# Patient Record
Sex: MALE | Race: OTHER | URBAN - NONMETROPOLITAN AREA
[De-identification: names, ages, dates, MRNs, and addresses within clinical notes are randomized per-mention and may not be internally consistent; named-entity substitution may affect disease eponyms.]

---

## 2019-04-16 ENCOUNTER — IMPORTED ENCOUNTER (OUTPATIENT)
Dept: URBAN - NONMETROPOLITAN AREA CLINIC 1 | Facility: CLINIC | Age: 74
End: 2019-04-16

## 2019-04-16 PROBLEM — H25.13: Noted: 2019-04-16

## 2019-04-16 PROBLEM — G51.0: Noted: 2019-04-16

## 2019-04-16 PROCEDURE — 92002 INTRM OPH EXAM NEW PATIENT: CPT

## 2019-07-11 NOTE — PATIENT DISCUSSION
Start Eryhtromycin becky to OS qhs for 1 week and prn. E-Rx sent to pharmacy and explained how to apply becky and that is thick and will cause blurred vision. Recommended regular use of ATs throughout the day and q 1-2 hours if needed. PF Refresh Reinaldo sample given today.

## 2019-07-11 NOTE — PATIENT DISCUSSION
Explained Ectropion and that tissues are exposed to the air and more irritated. Explained that using ATs will help keep exposed tissues moist and comfortable.

## 2019-10-23 NOTE — PATIENT DISCUSSION
Explained Ectropion and that using ATs during the daytime and Erythromycin becky qhs will help keep exposed tissues moist and comfortable.

## 2019-12-16 ENCOUNTER — IMPORTED ENCOUNTER (OUTPATIENT)
Dept: URBAN - NONMETROPOLITAN AREA CLINIC 1 | Facility: CLINIC | Age: 74
End: 2019-12-16

## 2019-12-16 PROBLEM — G51.0: Noted: 2019-12-16

## 2019-12-16 PROBLEM — B02.39: Noted: 2019-12-16

## 2019-12-16 PROBLEM — H25.13: Noted: 2019-12-16

## 2019-12-16 PROCEDURE — 92012 INTRM OPH EXAM EST PATIENT: CPT

## 2019-12-16 NOTE — PATIENT DISCUSSION
hzoster w/o corneal involvementeducate ptfinish valtrex 1000 mg tid po x 10 daysrtc prnBELL'S PALSY OD SIDECONT TEARS PRNSTART SHRAVAN QHS ODMONITORCataract OU-Not yet surgical. -Reviewed symptoms of advancing cataract growth such as glare and halos and decreased vision.-Continue to monitor for now. Pt will notify us if any new symptoms develop. HX OF LUKEMIACURRENTLY UNDER TX FOR

## 2020-09-14 ENCOUNTER — IMPORTED ENCOUNTER (OUTPATIENT)
Dept: URBAN - NONMETROPOLITAN AREA CLINIC 1 | Facility: CLINIC | Age: 75
End: 2020-09-14

## 2020-09-14 PROBLEM — H25.13: Noted: 2020-09-14

## 2020-09-14 PROBLEM — G51.0: Noted: 2020-09-14

## 2020-09-14 PROCEDURE — 99024 POSTOP FOLLOW-UP VISIT: CPT

## 2020-09-14 NOTE — PATIENT DISCUSSION
BELL'S PALSY OD SIDEresolvingHX OF LUKEMIACURRENTLY UNDER TX FORCataract(s)-Visually significant.-Cataract(s) causing symptomatic impairment of visual function not correctable with a tolerable change in glasses or contact lenses lighting or non-operative means resulting in specific activity limitations and/or participation restrictions including but not limited to reading viewing television driving or meeting vocational or recreational needs. -Expectation is clearer vision and reduced glare disability after cataract removal.-Refer to Dr David Barrientos for cataract evaluation

## 2020-11-13 ENCOUNTER — IMPORTED ENCOUNTER (OUTPATIENT)
Dept: URBAN - NONMETROPOLITAN AREA CLINIC 1 | Facility: CLINIC | Age: 75
End: 2020-11-13

## 2020-11-13 PROBLEM — H25.813: Noted: 2020-11-13

## 2020-11-13 PROBLEM — G51.0: Noted: 2020-11-13

## 2020-11-13 PROCEDURE — 92014 COMPRE OPH EXAM EST PT 1/>: CPT

## 2020-11-13 NOTE — PATIENT DISCUSSION
Cataract(s)-Visually significant cataract OU .-Cataract(s) causing symptomatic impairment of visual function not correctable with a tolerable change in glasses or contact lenses lighting or non-operative means resulting in specific activity limitations and/or participation restrictions including but not limited to reading viewing television driving or meeting vocational or recreational needs. -Expectation is clearer vision and functional improvement in symptoms as well as reduced glare disability after cataract removal.-Order IOLMaster and OPD today. -Recommend Stand/Trad  based on today's OPD testing and lifestyle questionnaire.-All questions were answered regarding surgery including pre and post-op medications appointments activity restrictions and anesthetic usage.-The risks benefits and alternatives and special risk factors for the patient were discussed in detail including but not limited to: bleeding infection retinal detachment vitreous loss problems with the implant and possible need for additional surgery.-Although rare the possibility of complete vision loss was discussed.-The possible need for glasses post-operatively was discussed.-Order medical clearance exam based on history of bells palsy -Patient elects to proceed with cataract surgery OD . Will schedule at patient's convenience and re-evaluate OS  in the future. Discussed all lens options Recommend Stand/Trad

## 2020-11-17 PROBLEM — G51.0: Noted: 2020-11-17

## 2020-11-17 PROBLEM — H25.813: Noted: 2020-11-17

## 2020-11-18 ENCOUNTER — IMPORTED ENCOUNTER (OUTPATIENT)
Dept: URBAN - NONMETROPOLITAN AREA CLINIC 1 | Facility: CLINIC | Age: 75
End: 2020-11-18

## 2020-11-18 PROBLEM — Z01.818: Noted: 2020-11-18

## 2020-11-18 PROBLEM — H25.813: Noted: 2020-11-18

## 2020-11-18 PROBLEM — G51.0: Noted: 2020-11-18

## 2020-12-01 ENCOUNTER — IMPORTED ENCOUNTER (OUTPATIENT)
Dept: URBAN - NONMETROPOLITAN AREA CLINIC 1 | Facility: CLINIC | Age: 75
End: 2020-12-01

## 2020-12-01 PROBLEM — Z98.41: Noted: 2020-12-01

## 2020-12-01 PROBLEM — G51.0: Noted: 2020-12-01

## 2020-12-01 PROBLEM — Z01.818: Noted: 2020-12-01

## 2020-12-01 PROBLEM — H25.813: Noted: 2020-12-01

## 2020-12-01 PROCEDURE — 99024 POSTOP FOLLOW-UP VISIT: CPT

## 2020-12-01 NOTE — PATIENT DISCUSSION
s/p PCIOL Stand/Trad IOL OD 11/30/20 JS-Pt doing well s/p PCIOL. -Continue post-op gtts according to instruction sheet and sleep with eye shield over eye for 7 nights.-Avoid bending at the waist lifting anything over 5lbs and dirty or riley environments. -RTC 1wk.

## 2020-12-07 ENCOUNTER — IMPORTED ENCOUNTER (OUTPATIENT)
Dept: URBAN - NONMETROPOLITAN AREA CLINIC 1 | Facility: CLINIC | Age: 75
End: 2020-12-07

## 2020-12-07 PROBLEM — Z01.818: Noted: 2020-12-07

## 2020-12-07 PROBLEM — Z98.41: Noted: 2020-12-01

## 2020-12-07 PROBLEM — G51.0: Noted: 2020-12-07

## 2020-12-07 PROBLEM — H25.812: Noted: 2020-12-07

## 2020-12-07 PROBLEM — H25.813: Noted: 2020-12-07

## 2020-12-07 PROCEDURE — 99024 POSTOP FOLLOW-UP VISIT: CPT

## 2020-12-07 NOTE — PATIENT DISCUSSION
Cataract(s)-Visually significant cataract OS. -Cataract(s) causing symptomatic impairment of visual function not correctable with a tolerable change in glasses or contact lenses lighting or non-operative means resulting in specific activity limitations and/or participation restrictions including but not limited to reading viewing television driving or meeting vocational or recreational needs. -Expectation is clearer vision and functional improvement in symptoms as well as reduced glare disability after cataract removal.-Recommend Stand'/TRAD based on previous OPD testing and lifestyle questionnaire.-All questions were answered regarding surgery including pre and post-op medications appointments activity restrictions and anesthetic usage.-The risks benefits and alternatives and special risk factors for the patient were discussed in detail including but not limited to: bleeding infection retinal detachment vitreous loss problems with the implant and possible need for additional surgery.-Although rare the possibility of complete vision loss was discussed.-The need for glasses post-operatively was discussed.-Patient elects to proceed with cataract surgery OS . s/p PCIOL-Pt doing well at 1 week s/p PCIOL. -Continue post-op gtts according to instruction sheet.-Okay to resume usual activites and d/c eye shield.

## 2020-12-10 ENCOUNTER — IMPORTED ENCOUNTER (OUTPATIENT)
Dept: URBAN - NONMETROPOLITAN AREA CLINIC 1 | Facility: CLINIC | Age: 75
End: 2020-12-10

## 2020-12-10 PROBLEM — H25.813: Noted: 2020-12-10

## 2020-12-10 PROBLEM — Z98.42: Noted: 2020-12-10

## 2020-12-10 PROBLEM — G51.0: Noted: 2020-12-10

## 2020-12-10 PROBLEM — Z01.818: Noted: 2020-12-10

## 2020-12-10 PROCEDURE — 99024 POSTOP FOLLOW-UP VISIT: CPT

## 2020-12-10 NOTE — PATIENT DISCUSSION
s/p PCIOL Stand/Trad IOL OS 12/6/20 JS-Pt doing well s/p PCIOL. -Continue post-op gtts according to instruction sheet and sleep with eye shield over eye for 7 nights.-Avoid bending at the waist lifting anything over 5lbs and dirty or riley environments. -RTC 1wk.

## 2020-12-16 ENCOUNTER — IMPORTED ENCOUNTER (OUTPATIENT)
Dept: URBAN - NONMETROPOLITAN AREA CLINIC 1 | Facility: CLINIC | Age: 75
End: 2020-12-16

## 2020-12-16 PROCEDURE — 99024 POSTOP FOLLOW-UP VISIT: CPT

## 2021-01-04 ENCOUNTER — IMPORTED ENCOUNTER (OUTPATIENT)
Dept: URBAN - NONMETROPOLITAN AREA CLINIC 1 | Facility: CLINIC | Age: 76
End: 2021-01-04

## 2021-01-04 PROCEDURE — 99024 POSTOP FOLLOW-UP VISIT: CPT

## 2021-05-03 ENCOUNTER — IMPORTED ENCOUNTER (OUTPATIENT)
Dept: URBAN - NONMETROPOLITAN AREA CLINIC 1 | Facility: CLINIC | Age: 76
End: 2021-05-03

## 2021-05-03 PROBLEM — H26.493: Noted: 2021-05-03

## 2021-05-03 PROBLEM — Z96.1: Noted: 2021-05-03

## 2021-05-03 PROBLEM — G51.0: Noted: 2020-12-10

## 2021-05-03 PROCEDURE — 92014 COMPRE OPH EXAM EST PT 1/>: CPT

## 2022-04-09 ASSESSMENT — VISUAL ACUITY
OS_SC: 20/80-1
OD_CC: 20/40
OS_CC: 20/40-2
OD_CC: 20/60
OD_CC: 20/40-2
OS_SC: 20/40
OS_CC: 20/80
OS_CC: 20/50
OD_CC: 20/30+2
OD_SC: 20/100
OS_CC: 20/50-2
OS_SC: 20/70
OS_AM: 20/30
OD_CC: 20/80
OD_PH: 20/70-1
OS_SC: 20/70
OS_PH: 20/60
OD_SC: 20/80-1
OS_CC: 20/30
OS_GLARE: 20/60
OS_GLARE: 20/60
OS_PH: 20/50
OD_CC: 20/40-1
OD_CC: 20/80-1
OD_PH: 20/60
OD_GLARE: 20/50
OD_PH: 20/30
OD_CC: 20/50-2
OS_CC: 20/30
OS_AM: 20/30
OS_PH: 20/40
OS_CC: 20/50
OD_CC: 20/30
OS_CC: 20/50
OD_PAM: 20/30
OS_PH: 20/50

## 2022-04-09 ASSESSMENT — TONOMETRY
OD_IOP_MMHG: 16
OS_IOP_MMHG: 16
OD_IOP_MMHG: 16
OD_IOP_MMHG: 14
OS_IOP_MMHG: 15
OD_IOP_MMHG: 15
OD_IOP_MMHG: 16
OS_IOP_MMHG: 14
OD_IOP_MMHG: 16
OS_IOP_MMHG: 16
OS_IOP_MMHG: 16
OD_IOP_MMHG: 16
OD_IOP_MMHG: 15
OS_IOP_MMHG: 16
OS_IOP_MMHG: 15